# Patient Record
Sex: FEMALE | URBAN - METROPOLITAN AREA
[De-identification: names, ages, dates, MRNs, and addresses within clinical notes are randomized per-mention and may not be internally consistent; named-entity substitution may affect disease eponyms.]

---

## 2020-07-25 ENCOUNTER — TELEPHONE ENCOUNTER (OUTPATIENT)
Dept: URBAN - METROPOLITAN AREA CLINIC 13 | Facility: CLINIC | Age: 85
End: 2020-07-25

## 2020-07-26 ENCOUNTER — TELEPHONE ENCOUNTER (OUTPATIENT)
Dept: URBAN - METROPOLITAN AREA CLINIC 13 | Facility: CLINIC | Age: 85
End: 2020-07-26

## 2020-07-26 RX ORDER — LORATADINE 10 MG/1
TAKE 1 TABLET DAILY AS DIRECTED TABLET, ORALLY DISINTEGRATING ORAL
Refills: 0 | Status: ACTIVE | COMMUNITY
Start: 2009-01-08

## 2021-06-11 ENCOUNTER — TELEPHONE ENCOUNTER (OUTPATIENT)
Dept: URBAN - METROPOLITAN AREA CLINIC 107 | Facility: CLINIC | Age: 86
End: 2021-06-11

## 2021-06-11 ENCOUNTER — TELEPHONE ENCOUNTER (OUTPATIENT)
Dept: URBAN - METROPOLITAN AREA CLINIC 113 | Facility: CLINIC | Age: 86
End: 2021-06-11

## 2021-08-12 ENCOUNTER — DASHBOARD ENCOUNTERS (OUTPATIENT)
Age: 86
End: 2021-08-12

## 2021-08-12 ENCOUNTER — WEB ENCOUNTER (OUTPATIENT)
Dept: URBAN - METROPOLITAN AREA CLINIC 113 | Facility: CLINIC | Age: 86
End: 2021-08-12

## 2021-08-12 ENCOUNTER — OFFICE VISIT (OUTPATIENT)
Dept: URBAN - METROPOLITAN AREA CLINIC 113 | Facility: CLINIC | Age: 86
End: 2021-08-12
Payer: MEDICARE

## 2021-08-12 VITALS
DIASTOLIC BLOOD PRESSURE: 65 MMHG | TEMPERATURE: 98.6 F | WEIGHT: 155 LBS | HEART RATE: 55 BPM | SYSTOLIC BLOOD PRESSURE: 111 MMHG | HEIGHT: 66 IN | BODY MASS INDEX: 24.91 KG/M2

## 2021-08-12 DIAGNOSIS — R13.19 ESOPHAGEAL DYSPHAGIA: ICD-10-CM

## 2021-08-12 PROCEDURE — 99203 OFFICE O/P NEW LOW 30 MIN: CPT | Performed by: INTERNAL MEDICINE

## 2021-08-12 RX ORDER — LOSARTAN POTASSIUM 100 MG/1
1 TABLET TABLET ORAL ONCE A DAY
Status: ACTIVE | COMMUNITY

## 2021-08-12 RX ORDER — ROSUVASTATIN CALCIUM 20 MG/1
1 TABLET TABLET, FILM COATED ORAL ONCE A DAY
Status: ACTIVE | COMMUNITY

## 2021-08-12 RX ORDER — TRIAMTERENE AND HYDROCHLOROTHIAZIDE 37.5; 25 MG/1; MG/1
1 TABLET IN THE MORNING TABLET ORAL ONCE A DAY
Status: ACTIVE | COMMUNITY

## 2021-08-12 RX ORDER — AMLODIPINE BESYLATE 10 MG/1
1 TABLET TABLET ORAL ONCE A DAY
Status: ACTIVE | COMMUNITY

## 2021-08-12 RX ORDER — FLUTICASONE PROPIONATE 50 UG/1
1 SPRAY IN EACH NOSTRIL SPRAY, METERED NASAL ONCE A DAY
Status: ACTIVE | COMMUNITY

## 2021-08-12 RX ORDER — CELECOXIB 200 MG/1
1 CAPSULE WITH FOOD CAPSULE ORAL ONCE A DAY
Status: ACTIVE | COMMUNITY

## 2021-08-12 RX ORDER — LORATADINE 10 MG/1
TAKE 1 TABLET DAILY AS DIRECTED TABLET, ORALLY DISINTEGRATING ORAL
Refills: 0 | Status: ON HOLD | COMMUNITY
Start: 2009-01-08

## 2021-08-12 RX ORDER — FAMOTIDINE 20 MG/1
1 TABLET AT BEDTIME AS NEEDED TABLET, FILM COATED ORAL ONCE A DAY
Status: ACTIVE | COMMUNITY

## 2021-08-12 NOTE — HPI-TODAY'S VISIT:
89-year-old female referred by Dr. Art Schulte for evaluation of dysphagia.  She complains of intermittent difficulty swallowing, characterized as food sticking in the cervical region. She is following careful swallow precautions currently. No heartburn, no regurgitation. No abdominal pain, nausea or vomiting. Bowels are moving normally.  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  Tertiary contractions within the mid and distal esophagus which can be seen with esophageal dysmotility.